# Patient Record
Sex: MALE | Race: WHITE | NOT HISPANIC OR LATINO | Employment: UNEMPLOYED | URBAN - METROPOLITAN AREA
[De-identification: names, ages, dates, MRNs, and addresses within clinical notes are randomized per-mention and may not be internally consistent; named-entity substitution may affect disease eponyms.]

---

## 2018-11-27 ENCOUNTER — OFFICE VISIT (OUTPATIENT)
Dept: FAMILY MEDICINE CLINIC | Facility: CLINIC | Age: 43
End: 2018-11-27

## 2018-11-27 VITALS
SYSTOLIC BLOOD PRESSURE: 118 MMHG | DIASTOLIC BLOOD PRESSURE: 70 MMHG | OXYGEN SATURATION: 99 % | RESPIRATION RATE: 16 BRPM | HEART RATE: 92 BPM | BODY MASS INDEX: 23.91 KG/M2 | HEIGHT: 69 IN | WEIGHT: 161.4 LBS

## 2018-11-27 DIAGNOSIS — G89.29 CHRONIC RIGHT-SIDED LOW BACK PAIN WITH RIGHT-SIDED SCIATICA: Primary | ICD-10-CM

## 2018-11-27 DIAGNOSIS — M54.41 CHRONIC RIGHT-SIDED LOW BACK PAIN WITH RIGHT-SIDED SCIATICA: Primary | ICD-10-CM

## 2018-11-27 PROCEDURE — 99203 OFFICE O/P NEW LOW 30 MIN: CPT | Performed by: PHYSICIAN ASSISTANT

## 2018-11-27 RX ORDER — MELOXICAM 7.5 MG/1
1 TABLET ORAL 2 TIMES DAILY
COMMUNITY
Start: 2018-11-08 | End: 2019-02-04 | Stop reason: CLARIF

## 2018-11-27 RX ORDER — METHYLPREDNISOLONE 4 MG/1
TABLET ORAL
Qty: 21 TABLET | Refills: 1 | Status: SHIPPED | OUTPATIENT
Start: 2018-11-27 | End: 2018-12-17

## 2018-11-27 NOTE — PROGRESS NOTES
Subjective   Gonsalo Vargas is a 43 y.o. male  Establish Care and Back Pain (Pain has been present for several yrs but gotten worse. Has had XR but req MRI. Pain is constant, low back with Rt side sciatica.)      History of Present Illness  Patient is a pleasant 43-year-old white male who's a new patient to practice he comes in complain chronic back pain for last couple of years over the last year his back is gotten worse she's having radicular pain in the left leg x-rays show large amount of degenerative changes in his legs he's been doing home exercises without any relief has seen pain management in the past.  Pain is 9 out of 10  The following portions of the patient's history were reviewed and updated as appropriate: allergies, current medications, past social history and problem list    Review of Systems   Constitutional: Negative for fatigue and unexpected weight change.   Respiratory: Negative for cough, chest tightness and shortness of breath.    Cardiovascular: Negative for chest pain, palpitations and leg swelling.   Gastrointestinal: Negative for nausea.   Musculoskeletal: Positive for back pain.   Skin: Negative for color change and rash.   Neurological: Negative for dizziness, syncope, weakness and headaches.       Objective     Vitals:    11/27/18 1058   BP: 118/70   Pulse: 92   Resp: 16   SpO2: 99%       Physical Exam   Constitutional: He appears well-developed and well-nourished.   Neck: No JVD present.   Cardiovascular: Normal rate, regular rhythm, normal heart sounds, intact distal pulses and normal pulses.   No murmur heard.  Pulmonary/Chest: Effort normal and breath sounds normal. No respiratory distress.   Abdominal: Soft. Bowel sounds are normal. There is no hepatosplenomegaly. There is no tenderness.   Musculoskeletal: He exhibits no edema.        Lumbar back: He exhibits decreased range of motion, tenderness, bony tenderness and swelling.   Skin: Skin is warm and dry.   Nursing note and vitals  reviewed.      Assessment/Plan     Diagnoses and all orders for this visit:    Chronic right-sided low back pain with right-sided sciatica  -     meloxicam (MOBIC) 7.5 MG tablet; Take 1 tablet by mouth 2 (Two) Times a Day.  -     MRI Lumbar Spine Without Contrast; Future  -     MethylPREDNISolone (MEDROL, JUN,) 4 MG tablet; Take as directed on package instructions.    Follow-up after MRI

## 2018-12-13 ENCOUNTER — HOSPITAL ENCOUNTER (OUTPATIENT)
Dept: MRI IMAGING | Facility: HOSPITAL | Age: 43
Discharge: HOME | End: 2018-12-13
Admitting: PHYSICIAN ASSISTANT

## 2018-12-13 DIAGNOSIS — G89.29 CHRONIC RIGHT-SIDED LOW BACK PAIN WITH RIGHT-SIDED SCIATICA: ICD-10-CM

## 2018-12-13 DIAGNOSIS — M54.41 CHRONIC RIGHT-SIDED LOW BACK PAIN WITH RIGHT-SIDED SCIATICA: ICD-10-CM

## 2018-12-13 PROCEDURE — 72148 MRI LUMBAR SPINE W/O DYE: CPT

## 2018-12-17 ENCOUNTER — OFFICE VISIT (OUTPATIENT)
Dept: FAMILY MEDICINE CLINIC | Facility: CLINIC | Age: 43
End: 2018-12-17

## 2018-12-17 VITALS
WEIGHT: 161 LBS | DIASTOLIC BLOOD PRESSURE: 74 MMHG | RESPIRATION RATE: 16 BRPM | SYSTOLIC BLOOD PRESSURE: 112 MMHG | BODY MASS INDEX: 23.85 KG/M2 | OXYGEN SATURATION: 99 % | HEIGHT: 69 IN | HEART RATE: 92 BPM

## 2018-12-17 DIAGNOSIS — M51.26 HNP (HERNIATED NUCLEUS PULPOSUS), LUMBAR: Primary | ICD-10-CM

## 2018-12-17 PROCEDURE — 99213 OFFICE O/P EST LOW 20 MIN: CPT | Performed by: PHYSICIAN ASSISTANT

## 2018-12-17 RX ORDER — METHYLPREDNISOLONE 4 MG/1
TABLET ORAL
Qty: 21 TABLET | Refills: 1 | Status: SHIPPED | OUTPATIENT
Start: 2018-12-17 | End: 2019-01-16

## 2018-12-17 RX ORDER — TERBINAFINE HYDROCHLORIDE 250 MG/1
250 TABLET ORAL DAILY
Refills: 2 | COMMUNITY
Start: 2018-12-12

## 2018-12-17 RX ORDER — ATORVASTATIN CALCIUM 20 MG/1
20 TABLET, FILM COATED ORAL
Refills: 3 | COMMUNITY
Start: 2018-11-27

## 2018-12-17 NOTE — PROGRESS NOTES
Subjective   Gonsalo Vargas is a 43 y.o. male  Back Pain (F/U on MRI)      History of Present Illness  Patient is a 43-year-old white male who comes in complaining of HNP, patient's pain with flexion and extension large amount stiffness and pain pain is worse with sitting standing MRI showed disc disc bulging with nerve impingement    Patient's pain is not controlled  The following portions of the patient's history were reviewed and updated as appropriate: allergies, current medications, past social history and problem list    Review of Systems   Constitutional: Negative for fatigue and unexpected weight change.   Respiratory: Negative for cough, chest tightness and shortness of breath.    Cardiovascular: Negative for chest pain, palpitations and leg swelling.   Gastrointestinal: Negative for nausea.   Musculoskeletal: Positive for back pain.   Skin: Negative for color change and rash.   Neurological: Negative for dizziness, syncope, weakness and headaches.       Objective     Vitals:    12/17/18 1305   BP: 112/74   Pulse: 92   Resp: 16   SpO2: 99%       Physical Exam   Constitutional: He appears well-developed and well-nourished.   Neck: No JVD present.   Cardiovascular: Normal rate, regular rhythm, normal heart sounds, intact distal pulses and normal pulses.   No murmur heard.  Pulmonary/Chest: Effort normal and breath sounds normal. No respiratory distress.   Abdominal: Soft. Bowel sounds are normal. There is no hepatosplenomegaly. There is no tenderness.   Musculoskeletal: He exhibits no edema.        Lumbar back: He exhibits decreased range of motion, tenderness and bony tenderness.   Skin: Skin is warm and dry.   Nursing note and vitals reviewed.      Assessment/Plan     Diagnoses and all orders for this visit:    HNP (herniated nucleus pulposus), lumbar  -     MethylPREDNISolone (MEDROL, JUN,) 4 MG tablet; Take as directed on package instructions.    Other orders  -     atorvastatin (LIPITOR) 20 MG tablet;  Take 20 mg by mouth every night at bedtime.  -     terbinafine (lamiSIL) 250 MG tablet; Take 250 mg by mouth Daily.    Norco 10/325 1 by mouth 4 times a day dispensed 60    Follow-up as needed

## 2019-01-04 ENCOUNTER — TRANSCRIBE ORDERS (OUTPATIENT)
Dept: FAMILY MEDICINE CLINIC | Facility: CLINIC | Age: 44
End: 2019-01-04

## 2019-01-04 DIAGNOSIS — M51.26 HERNIATED NUCLEUS PULPOSUS, LUMBAR: Primary | ICD-10-CM

## 2019-01-16 ENCOUNTER — OFFICE VISIT (OUTPATIENT)
Dept: FAMILY MEDICINE CLINIC | Facility: CLINIC | Age: 44
End: 2019-01-16

## 2019-01-16 VITALS
HEART RATE: 97 BPM | WEIGHT: 155 LBS | OXYGEN SATURATION: 99 % | SYSTOLIC BLOOD PRESSURE: 126 MMHG | BODY MASS INDEX: 22.96 KG/M2 | DIASTOLIC BLOOD PRESSURE: 80 MMHG | HEIGHT: 69 IN | RESPIRATION RATE: 16 BRPM

## 2019-01-16 DIAGNOSIS — G89.29 CHRONIC RIGHT-SIDED LOW BACK PAIN WITH RIGHT-SIDED SCIATICA: Primary | ICD-10-CM

## 2019-01-16 DIAGNOSIS — M54.41 CHRONIC RIGHT-SIDED LOW BACK PAIN WITH RIGHT-SIDED SCIATICA: Primary | ICD-10-CM

## 2019-01-16 PROCEDURE — 99213 OFFICE O/P EST LOW 20 MIN: CPT | Performed by: PHYSICIAN ASSISTANT

## 2019-01-16 RX ORDER — HYDROCODONE BITARTRATE AND ACETAMINOPHEN 10; 325 MG/1; MG/1
TABLET ORAL
Refills: 0 | COMMUNITY
Start: 2018-12-24

## 2019-01-16 NOTE — PROGRESS NOTES
Subjective   Gonsalo Vargas is a 43 y.o. male  Back Pain (Req RF for Norco 10/325 q6hrs. Last filled #32 Dec 24. Waiting on NS appt.)      History of Present Illness  Patient is a 43-year-old white male who comes in plan back pain chronic in nature MRI showed H&P was some nerve impingement patient's pain is 7 out of 10 like to be referred to neurosurgery states she has trouble sitting standing for long.  Time describes pain as sharp stabbing      The following portions of the patient's history were reviewed and updated as appropriate: allergies, current medications, past social history and problem list    Review of Systems   Constitutional: Negative for fatigue and unexpected weight change.   Respiratory: Negative for cough, chest tightness and shortness of breath.    Cardiovascular: Negative for chest pain, palpitations and leg swelling.   Gastrointestinal: Negative for nausea.   Musculoskeletal: Positive for back pain.   Skin: Negative for color change and rash.   Neurological: Negative for dizziness, syncope, weakness and headaches.       Objective     Vitals:    01/16/19 1003   BP: 126/80   Pulse: 97   Resp: 16   SpO2: 99%       Physical Exam   Constitutional: He appears well-developed and well-nourished.   Neck: No JVD present.   Cardiovascular: Normal rate, regular rhythm, normal heart sounds, intact distal pulses and normal pulses.   No murmur heard.  Pulmonary/Chest: Effort normal and breath sounds normal. No respiratory distress.   Abdominal: Soft. Bowel sounds are normal. There is no hepatosplenomegaly. There is no tenderness.   Musculoskeletal: He exhibits no edema.        Lumbar back: He exhibits decreased range of motion and tenderness.   Skin: Skin is warm and dry.   Nursing note and vitals reviewed.      Assessment/Plan     Diagnoses and all orders for this visit:    Chronic right-sided low back pain with right-sided sciatica    Other orders  -     HYDROcodone-acetaminophen (NORCO)  MG per  tablet; TAKE 1 TABLET BY MOUTH EVERY 6 TO 8 HOURS    #1 Norco 10/325 1 by mouth 4 times a day #30    Follow-up as needed, patient's upcoming appointment with neurosurgery for evaluation of back, time spent with patient 10:00 10:15 AM, 9 minutes that time used discussed treatment plan long-term treatment plan

## 2019-02-04 ENCOUNTER — OFFICE VISIT (OUTPATIENT)
Dept: FAMILY MEDICINE CLINIC | Facility: CLINIC | Age: 44
End: 2019-02-04

## 2019-02-04 ENCOUNTER — OFFICE VISIT (OUTPATIENT)
Dept: NEUROSURGERY | Facility: CLINIC | Age: 44
End: 2019-02-04

## 2019-02-04 VITALS
WEIGHT: 171 LBS | BODY MASS INDEX: 25.33 KG/M2 | TEMPERATURE: 97.7 F | HEIGHT: 69 IN | SYSTOLIC BLOOD PRESSURE: 118 MMHG | DIASTOLIC BLOOD PRESSURE: 70 MMHG

## 2019-02-04 VITALS
HEIGHT: 69 IN | RESPIRATION RATE: 14 BRPM | HEART RATE: 87 BPM | BODY MASS INDEX: 25.33 KG/M2 | OXYGEN SATURATION: 99 % | SYSTOLIC BLOOD PRESSURE: 121 MMHG | WEIGHT: 171 LBS | DIASTOLIC BLOOD PRESSURE: 68 MMHG

## 2019-02-04 DIAGNOSIS — M79.604 RIGHT LEG PAIN: ICD-10-CM

## 2019-02-04 DIAGNOSIS — M51.36 DDD (DEGENERATIVE DISC DISEASE), LUMBAR: Primary | ICD-10-CM

## 2019-02-04 DIAGNOSIS — M51.26 HERNIATED LUMBAR INTERVERTEBRAL DISC: Primary | ICD-10-CM

## 2019-02-04 PROCEDURE — 99243 OFF/OP CNSLTJ NEW/EST LOW 30: CPT | Performed by: NEUROLOGICAL SURGERY

## 2019-02-04 PROCEDURE — 99213 OFFICE O/P EST LOW 20 MIN: CPT | Performed by: PHYSICIAN ASSISTANT

## 2019-02-04 RX ORDER — NABUMETONE 750 MG/1
750 TABLET, FILM COATED ORAL 2 TIMES DAILY
Qty: 60 TABLET | Refills: 0 | Status: SHIPPED | OUTPATIENT
Start: 2019-02-04

## 2019-02-04 RX ORDER — METHOCARBAMOL 750 MG/1
750 TABLET, FILM COATED ORAL NIGHTLY
Qty: 30 TABLET | Refills: 0 | Status: SHIPPED | OUTPATIENT
Start: 2019-02-04

## 2019-02-04 NOTE — PROGRESS NOTES
Subjective   Gonsalo Vargas is a 43 y.o. male  Back Pain (F/U. Saw Dr. Owens (NS) today and had MRI. Prescribed Robaxin and Relafen but ref back to PCP for pain mgmt. Declines surgical intervention although a candidate. )      Back Pain   This is a new problem. The pain is present in the sacro-iliac and lumbar spine. The pain radiates to the right foot and left foot. The pain is at a severity of 8/10. The pain is moderate. The symptoms are aggravated by sitting, standing and twisting. Pertinent negatives include no chest pain, headaches or weakness. Risk factors include lack of exercise. The treatment provided moderate relief.   Patient will like a referral to pain management he was told he had severe degenerative disc disease he is candidate for surgery but he wants to try pain management first    The following portions of the patient's history were reviewed and updated as appropriate: allergies, current medications, past social history and problem list    Review of Systems   Constitutional: Negative for fatigue and unexpected weight change.   Respiratory: Negative for cough, chest tightness and shortness of breath.    Cardiovascular: Negative for chest pain, palpitations and leg swelling.   Gastrointestinal: Negative for nausea.   Musculoskeletal: Positive for back pain.   Skin: Negative for color change and rash.   Neurological: Negative for dizziness, syncope, weakness and headaches.       Objective     Vitals:    02/04/19 1443   BP: 121/68   Pulse: 87   Resp: 14   SpO2: 99%       Physical Exam   Constitutional: He appears well-developed and well-nourished.   Neck: Neck supple. No JVD present. No thyromegaly present.   Cardiovascular: Normal rate, regular rhythm, normal heart sounds, intact distal pulses and normal pulses.   No murmur heard.  Pulmonary/Chest: Effort normal and breath sounds normal. No respiratory distress.   Abdominal: Soft. Bowel sounds are normal. There is no hepatosplenomegaly. There is no  tenderness.   Musculoskeletal: He exhibits no edema.        Lumbar back: He exhibits decreased range of motion, tenderness and bony tenderness.   Lymphadenopathy:     He has no cervical adenopathy.   Neurological: No sensory deficit.   Skin: Skin is warm and dry. He is not diaphoretic.   Nursing note and vitals reviewed.      Assessment/Plan     Diagnoses and all orders for this visit:    DDD (degenerative disc disease), lumbar  -     Ambulatory Referral to Pain Management    Norco 10/325 1 by mouth 3 times a day dispense 90, refer to pain management informed patient that he would have to see a [facility] pain management to continue continue his pain medication

## 2019-02-04 NOTE — PROGRESS NOTES
Gonsalo Vargas  1975  [id number]      Chief Complaint   Patient presents with   • Back Pain       HISTORY OF PRESENT ILLNESS: This is a 43-year-old male who presents with a chief complaint of pain in his back rating into his right leg of approximately 6 months duration.  He has a history of chronic low back pain with exacerbations and remissions which she has had for years.  In the past he has been to pain management however because of his insurance difficulties has not attended pain management for some time.  He now has a medical card and seeking medical attention.  He has pain in the lumbar area extending to the mid thoracic region.  This is chronic and long-standing.  Only recently has he had radiculopathy in his right leg in the vicinity of the first sacral nerve root.    Lumbar MRI was performed is referred for neurosurgical consultation.  He has a genetic predisposition for degenerative disc disease.    Past Medical History:   Diagnosis Date   • Arthritis    • Hyperlipidemia    • Low back pain        Past Surgical History:   Procedure Laterality Date   • APPENDECTOMY  2009/2010   • VASECTOMY  2007/2008       Family History   Problem Relation Age of Onset   • Arthritis Mother    • Hyperlipidemia Mother    • Heart disease Father        Social History     Socioeconomic History   • Marital status:      Spouse name: Not on file   • Number of children: Not on file   • Years of education: Not on file   • Highest education level: Not on file   Social Needs   • Financial resource strain: Not on file   • Food insecurity - worry: Not on file   • Food insecurity - inability: Not on file   • Transportation needs - medical: Not on file   • Transportation needs - non-medical: Not on file   Occupational History   • Not on file   Tobacco Use   • Smoking status: Current Every Day Smoker     Packs/day: 1.50     Types: Cigarettes   • Smokeless tobacco: Never Used   • Tobacco comment: Started when 19 y/o   Substance  and Sexual Activity   • Alcohol use: No     Frequency: Never     Comment: Quit in 2016   • Drug use: No   • Sexual activity: Defer   Other Topics Concern   • Not on file   Social History Narrative   • Not on file       No Known Allergies      Current Outpatient Medications:   •  atorvastatin (LIPITOR) 20 MG tablet, Take 20 mg by mouth every night at bedtime., Disp: , Rfl: 3  •  HYDROcodone-acetaminophen (NORCO)  MG per tablet, TAKE 1 TABLET BY MOUTH EVERY 6 TO 8 HOURS, Disp: , Rfl: 0  •  meloxicam (MOBIC) 7.5 MG tablet, Take 1 tablet by mouth 2 (Two) Times a Day., Disp: , Rfl:   •  terbinafine (lamiSIL) 250 MG tablet, Take 250 mg by mouth Daily., Disp: , Rfl: 2    Review of Systems   Constitutional: Negative for activity change, appetite change, chills, diaphoresis, fatigue, fever and unexpected weight change.   HENT: Negative for congestion, dental problem, drooling, ear discharge, ear pain, facial swelling, hearing loss, mouth sores, nosebleeds, postnasal drip, rhinorrhea, sinus pressure, sinus pain, sneezing, sore throat, tinnitus, trouble swallowing and voice change.    Eyes: Negative for photophobia, pain, discharge, redness, itching and visual disturbance.   Respiratory: Negative for apnea, cough, choking, chest tightness, shortness of breath, wheezing and stridor.    Cardiovascular: Negative for chest pain, palpitations and leg swelling.   Gastrointestinal: Negative for abdominal distention, abdominal pain, anal bleeding, blood in stool, constipation, diarrhea, nausea, rectal pain and vomiting.   Endocrine: Negative for cold intolerance, heat intolerance, polydipsia, polyphagia and polyuria.   Genitourinary: Negative for decreased urine volume, difficulty urinating, dysuria, enuresis, flank pain, frequency, genital sores, hematuria and urgency.   Musculoskeletal: Positive for back pain. Negative for arthralgias, gait problem, joint swelling, myalgias, neck pain and neck stiffness.   Skin: Negative for  "color change, pallor, rash and wound.   Allergic/Immunologic: Negative for environmental allergies, food allergies and immunocompromised state.   Neurological: Positive for numbness. Negative for dizziness, tremors, seizures, syncope, facial asymmetry, speech difficulty, weakness, light-headedness and headaches.   Hematological: Negative for adenopathy. Does not bruise/bleed easily.   Psychiatric/Behavioral: Negative for agitation, behavioral problems, confusion, decreased concentration, dysphoric mood, hallucinations, self-injury, sleep disturbance and suicidal ideas. The patient is not nervous/anxious and is not hyperactive.        Vitals:    02/04/19 0859   BP: 118/70   BP Location: Right arm   Patient Position: Sitting   Temp: 97.7 °F (36.5 °C)   TempSrc: Temporal   Weight: 77.6 kg (171 lb)   Height: 175.3 cm (69.02\")       Neurological Examination:    Mental status/speech: The patient is alert and oriented.  Speech is clear without aphysia or dysarthria.  No overt cognitive deficits.    Cranial nerve examination:    Olfaction: Smell is intact.  Vision: Vision is intact without visual field abnormalities.  Funduscopic examination is normal.  No pupillary irregularity.  Ocular motor examination: The extraocular muscles are intact.  There is no diplopia.  The pupil is round and reactive to both light and accommodation.  There is no nystagmus.  Facial movement/sensation: There is no facial weakness.  Sensation is intact in the first, second, and third divisions of the trigeminal nerve.  The corneal reflex is intact.  Auditory: Hearing is intact to finger rub bilaterally.  Cranial nerves IX, X, XI, XII: Phonation is normal.  No dysphagia.  Tongue is protruded in the midline without atrophy.  The gag reflex is intact.  Shoulder shrug is normal.    Musculoligamentous ligamentous examination: He has a positive straight leg raising to the right.  The right Achilles reflex is diminished while the others are easily " elicitable.  He has no weakness or sensory loss however.  He has diminished range of motion of the lumbar spine.    Medical Decision Making:     Diagnostic Data Set: The lumbar MRI data set shows disc degeneration with a large sequestrated fragment and herniation of the disc material L5-S1 to the right providing anatomical/clinical correlation.      Assessment: Herniated intervertebral disc L5-S1, right          Recommendations: Although he has a surgically correctable abnormality he is adamant about not proceeding with surgical intervention.  I have given him a prescription of Relafen 750 mg twice daily and Robaxin 750 mg at night and will return him to your care.  I would recommend pain management as I do not know of any option for him.  He is a smoker which makes the degeneration proceed much faster.  I have discussed this with him in detail.  He is a surgical candidate however for him to have full benefit he would need to quit smoking.  If I can help further I be more than happy to do so.  Additionally I am more than happy to provide care if he wishes.        I greatly appreciate the opportunity to see and evaluate this individual.  If you have questions or concerns regarding issues that I may have overlooked please call me at any time: [phone].  Joss Owens M.D.  [facility]  [address].  [place] [id number]    Scribed for Oleksandr Owens MD by Angella Gorman CMA. 2/4/2019 9:21 AM     I have read and concur with the information provided by the scribe.  Oleksandr Owens MD

## 2019-02-11 ENCOUNTER — TELEPHONE (OUTPATIENT)
Dept: FAMILY MEDICINE CLINIC | Facility: CLINIC | Age: 44
End: 2019-02-11

## 2019-02-11 NOTE — TELEPHONE ENCOUNTER
----- Message from Kennedi Dorman sent at 2/11/2019 10:18 AM EST -----  Contact: PT.  PT. DAMARIS WARE.  PT. IS NEEDING A PRIOR AUTH. ON:  HYDROcodone-acetaminophen (NORCO)  MG per tablet  0 12/24/2018    Sig: TAKE 1 TABLET BY MOUTH EVERY 6 TO 8 HOURS   Class: Historical Med   Earliest Fill Date: 12/24/2018     RX=[facility] - [address] - [phone]  - [phone] (Phone)  [phone] (Fax)    PT. CONTACTED RX, STATED AWAITING ON REPLY FROM OFFICE.    PT. CAN BE REACHED @ [facility] #.

## 2019-03-01 ENCOUNTER — OFFICE VISIT (OUTPATIENT)
Dept: FAMILY MEDICINE CLINIC | Facility: CLINIC | Age: 44
End: 2019-03-01

## 2019-03-01 VITALS
WEIGHT: 166.4 LBS | HEIGHT: 69 IN | BODY MASS INDEX: 24.65 KG/M2 | DIASTOLIC BLOOD PRESSURE: 74 MMHG | HEART RATE: 103 BPM | SYSTOLIC BLOOD PRESSURE: 118 MMHG | RESPIRATION RATE: 14 BRPM | OXYGEN SATURATION: 99 %

## 2019-03-01 DIAGNOSIS — M51.36 DDD (DEGENERATIVE DISC DISEASE), LUMBAR: Primary | ICD-10-CM

## 2019-03-01 PROCEDURE — 99213 OFFICE O/P EST LOW 20 MIN: CPT | Performed by: PHYSICIAN ASSISTANT

## 2019-03-01 NOTE — PROGRESS NOTES
Subjective   Gonsalo Vargas is a 43 y.o. male  DDD (Req RF for Norco 10/325 TID. Saw Dr. Owens (NS) Feb 4-see note.)      History of Present Illness  Patient is a 43-year-old white male who comes in complaining of chronic low back pain he has history of degenerative disc disease he was seen by neurosurgeon and was told he needed to go to pain management he has a pain management appointment in a month patient is out of pain medication he was prescription to hold him over to see the pain doctor  The following portions of the patient's history were reviewed and updated as appropriate: allergies, current medications, past social history and problem list    Review of Systems   Constitutional: Negative for appetite change, diaphoresis, fatigue and unexpected weight change.   Eyes: Negative for visual disturbance.   Respiratory: Negative for cough, chest tightness and shortness of breath.    Cardiovascular: Negative for chest pain, palpitations and leg swelling.   Gastrointestinal: Negative for diarrhea, nausea and vomiting.   Endocrine: Negative for polydipsia, polyphagia and polyuria.   Musculoskeletal: Positive for back pain.   Skin: Negative for color change and rash.   Neurological: Negative for dizziness, syncope, weakness, light-headedness, numbness and headaches.       Objective     Vitals:    03/01/19 1034   BP: 118/74   Pulse: 103   Resp: 14   SpO2: 99%       Physical Exam   Constitutional: He appears well-developed and well-nourished.   Neck: No JVD present.   Cardiovascular: Normal rate, regular rhythm, normal heart sounds, intact distal pulses and normal pulses.   No murmur heard.  Pulmonary/Chest: Effort normal and breath sounds normal. No respiratory distress.   Abdominal: Soft. Bowel sounds are normal. There is no hepatosplenomegaly. There is no tenderness.   Musculoskeletal: He exhibits no edema.        Lumbar back: He exhibits decreased range of motion, tenderness and bony tenderness.   Skin: Skin is  warm and dry.   Nursing note and vitals reviewed.      Assessment/Plan     Diagnoses and all orders for this visit:    DDD (degenerative disc disease), lumbar    [place] 10/325 1 p.o. 3 times daily dispense 90    Patient follow-up with pain management in 1 month explained to patient this was short-term prescription for his chronic pain that he would be seeing pain management for his back pain patient agrees understands time spent with patient 1030 at 10:45 AM, 9 minutes at time we discussed treatment plan long-term treatment plan warned of abuse potential